# Patient Record
Sex: FEMALE | Race: BLACK OR AFRICAN AMERICAN | NOT HISPANIC OR LATINO | Employment: PART TIME | URBAN - METROPOLITAN AREA
[De-identification: names, ages, dates, MRNs, and addresses within clinical notes are randomized per-mention and may not be internally consistent; named-entity substitution may affect disease eponyms.]

---
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
## 2022-04-26 NOTE — DISCHARGE INSTRUCTIONS
Drink plenty of fluids, do not skip meals (frequent small meals or snacks).  Follow-up with your physician about dietary concerns as needed.    If you feel lightheaded as if you are going to faint sit down or lie down with feet up immediately until feeling passes.  Drink fluids including salt and something sweet.  If fainting recurs or persists follow-up with your physician or in the ED. Also follow-up in the ED if you developed chest pains palpitations fainting with exercise.    Return to Emergency Department for uncontrollable pain, vomiting, lethargy, change in mental status, weakness, numbness, change in vision, hearing, speech or gait, or if Valerie  seems worse to you.  
None